# Patient Record
Sex: MALE | Race: WHITE | Employment: OTHER | URBAN - METROPOLITAN AREA
[De-identification: names, ages, dates, MRNs, and addresses within clinical notes are randomized per-mention and may not be internally consistent; named-entity substitution may affect disease eponyms.]

---

## 2024-10-09 ENCOUNTER — HOSPITAL ENCOUNTER (EMERGENCY)
Age: 64
Discharge: HOME | End: 2024-10-09
Payer: COMMERCIAL

## 2024-10-09 VITALS
OXYGEN SATURATION: 97 % | DIASTOLIC BLOOD PRESSURE: 80 MMHG | HEIGHT: 76 IN | SYSTOLIC BLOOD PRESSURE: 142 MMHG | WEIGHT: 295 LBS | HEART RATE: 81 BPM | RESPIRATION RATE: 16 BRPM | TEMPERATURE: 98 F | BODY MASS INDEX: 35.92 KG/M2

## 2024-10-09 DIAGNOSIS — L50.9 URTICARIA: ICD-10-CM

## 2024-10-09 DIAGNOSIS — B37.2 CANDIDAL INTERTRIGO: Primary | ICD-10-CM

## 2024-10-09 PROCEDURE — 99283 EMERGENCY DEPT VISIT LOW MDM: CPT

## 2024-10-09 RX ORDER — KETOCONAZOLE 20 MG/G
1 CREAM TOPICAL DAILY
Qty: 30 G | Refills: 0 | Status: SHIPPED | OUTPATIENT
Start: 2024-10-09

## 2024-10-09 RX ORDER — PREDNISONE 20 MG/1
40 TABLET ORAL DAILY
Qty: 10 TABLET | Refills: 0 | Status: SHIPPED | OUTPATIENT
Start: 2024-10-09 | End: 2024-10-14

## 2024-10-09 NOTE — ED PROVIDER NOTES
Patient Seen in: ward Emergency Department In [place]      History     Chief Complaint   Patient presents with    Rash Skin Problem     Stated Complaint: generalized rash to bilateral arms and face x2 days.    Subjective:   HPI      Pleasant 64-year-old gentleman.  Patient arrives for evaluation of 2 separate areas of rash.  Unsure if related.  He developed an intense itching and burning to the groin region 2 or 3 days prior to arrival.  Shortly after, he developed a more generalized blanching rash to his upper extremities, face and anterior neck/chest.  These lesions have minimal symptoms.  In no point did he have any acute difficulty breathing or swallowing.  No obvious new exposures, medications or supplements.  No new soaps.  No recent travel.  Normal recent health.    Objective:     History reviewed. No pertinent past medical history.           History reviewed. No pertinent surgical history.             Social History     Socioeconomic History    Marital status:    Tobacco Use    Smoking status: Never   Vaping Use    Vaping status: Never Used   Substance and Sexual Activity    Alcohol use: Never    Drug use: Never                  Physical Exam     ED Triage Vitals [10/09/24 1354]   /80   Pulse 81   Resp 16   Temp 97.9 °F (36.6 °C)   Temp src Temporal   SpO2 97 %   O2 Device None (Room air)       Current Vitals:   Vital Signs  BP: 142/80  Pulse: 81  Resp: 16  Temp: 97.9 °F (36.6 °C)  Temp src: Temporal    Oxygen Therapy  SpO2: 97 %  O2 Device: None (Room air)        Physical Exam  Gen: Well appearing, well groomed, alert and aware x 3  Lung: No distress, RR, no retraction  Extremities: Full ROM, no deformity, NVI  Skin: To the groin, there is a more vivid red discoloration with moist skin and some satellite lesions to the skin folds.  To the upper extremities, anterior neck and face there are blanching urticaria without vesicles, petechiae or desquamation.  Posterior oropharynx completely  benign in appearance.  Neuro:  Normal Gait    ED Course   Labs Reviewed - No data to display                MDM    Patient has some generalized subtle urticaria to the upper extremities, neck and face.  The groin is very suggestive of a candidal intertrigo.  We discussed both etiologies at length    Continue Zyrtec in the morning and Benadryl every 6-8 hours throughout the afternoon and evening.   optional over-the-counter Pepcid twice daily.  Take steroid as written    For the groin area, keep area as cool and dry as possible.  Thin layer of new antifungal cream twice daily until resolved.  Treatment typically takes 7 to 14 days.  Moving forward, baby powder can be applied to the area to help absorb excess moisture    Medical Decision Making      Disposition and Plan     Clinical Impression:  1. Candidal intertrigo    2. Urticaria         Disposition:  Discharge  10/9/2024  2:14 pm    Follow-up:  No follow-up provider specified.        Medications Prescribed:  Current Discharge Medication List        START taking these medications    Details   predniSONE 20 MG Oral Tab Take 2 tablets (40 mg total) by mouth daily for 5 days.  Qty: 10 tablet, Refills: 0      ketoconazole 2 % External Cream Apply 1 Application topically daily.  Qty: 30 g, Refills: 0                 Supplementary Documentation:

## 2024-10-09 NOTE — DISCHARGE INSTRUCTIONS
Continue Zyrtec in the morning and Benadryl every 6-8 hours throughout the afternoon and evening.   optional over-the-counter Pepcid twice daily.  Take steroid as written    For the groin area, keep area as cool and dry as possible.  Thin layer of new antifungal cream twice daily until resolved.  Treatment typically takes 7 to 14 days.  Moving forward, baby powder can be applied to the area to help absorb excess moisture